# Patient Record
Sex: FEMALE | Race: WHITE
[De-identification: names, ages, dates, MRNs, and addresses within clinical notes are randomized per-mention and may not be internally consistent; named-entity substitution may affect disease eponyms.]

---

## 2020-09-02 ENCOUNTER — HOSPITAL ENCOUNTER (EMERGENCY)
Dept: HOSPITAL 12 - ER | Age: 35
LOS: 1 days | Discharge: HOME | End: 2020-09-03
Payer: COMMERCIAL

## 2020-09-02 VITALS — BODY MASS INDEX: 18.51 KG/M2 | WEIGHT: 125 LBS | HEIGHT: 69 IN

## 2020-09-02 DIAGNOSIS — Z85.850: ICD-10-CM

## 2020-09-02 DIAGNOSIS — E89.0: ICD-10-CM

## 2020-09-02 DIAGNOSIS — S09.90XA: Primary | ICD-10-CM
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
## 2020-09-02 PROCEDURE — A4663 DIALYSIS BLOOD PRESSURE CUFF: HCPCS

## 2020-09-03 VITALS — DIASTOLIC BLOOD PRESSURE: 81 MMHG | SYSTOLIC BLOOD PRESSURE: 128 MMHG

## 2020-09-03 NOTE — NUR
Patient discharged to home in stable condition.  Written and verbal after care 
instructions given. 

Patient verbalizes understanding of instructions. Stressed follow up or return 
to ER for worsening s/s. Patient states she has a friend to pick her up & drive 
her home.

## 2021-05-24 ENCOUNTER — HOSPITAL ENCOUNTER (EMERGENCY)
Dept: HOSPITAL 12 - ER | Age: 36
Discharge: HOME | End: 2021-05-24
Payer: COMMERCIAL

## 2021-05-24 VITALS — HEIGHT: 67 IN | BODY MASS INDEX: 19.04 KG/M2 | WEIGHT: 121.31 LBS

## 2021-05-24 VITALS — DIASTOLIC BLOOD PRESSURE: 82 MMHG | SYSTOLIC BLOOD PRESSURE: 135 MMHG

## 2021-05-24 DIAGNOSIS — E89.0: ICD-10-CM

## 2021-05-24 DIAGNOSIS — E83.42: ICD-10-CM
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
## 2021-05-24 DIAGNOSIS — M79.622: Primary | ICD-10-CM

## 2021-05-24 DIAGNOSIS — T50.B95A: ICD-10-CM

## 2021-05-24 LAB
ALP SERPL-CCNC: 47 U/L (ref 50–136)
ALT SERPL W/O P-5'-P-CCNC: 21 U/L (ref 14–59)
AST SERPL-CCNC: 19 U/L (ref 15–37)
BASOPHILS # BLD AUTO: 0 K/UL (ref 0–8)
BASOPHILS NFR BLD AUTO: 0.2 % (ref 0–2)
BILIRUB SERPL-MCNC: 0.8 MG/DL (ref 0.2–1)
BUN SERPL-MCNC: 11 MG/DL (ref 7–18)
CHLORIDE SERPL-SCNC: 106 MMOL/L (ref 98–107)
CO2 SERPL-SCNC: 31 MMOL/L (ref 21–32)
CREAT SERPL-MCNC: 1.1 MG/DL (ref 0.6–1.3)
EOSINOPHIL # BLD AUTO: 0.1 K/UL (ref 0–0.7)
EOSINOPHIL NFR BLD AUTO: 1.5 % (ref 0–7)
GLUCOSE SERPL-MCNC: 103 MG/DL (ref 74–106)
HCT VFR BLD AUTO: 42.5 % (ref 31.2–41.9)
HGB BLD-MCNC: 14.2 G/DL (ref 10.9–14.3)
LYMPHOCYTES # BLD AUTO: 2.1 K/UL (ref 20–40)
LYMPHOCYTES NFR BLD AUTO: 30.6 % (ref 20.5–51.5)
MAGNESIUM SERPL-MCNC: 1.8 MG/DL (ref 1.8–2.4)
MCH RBC QN AUTO: 29.2 UUG (ref 24.7–32.8)
MCHC RBC AUTO-ENTMCNC: 33 G/DL (ref 32.3–35.6)
MCV RBC AUTO: 87.6 FL (ref 75.5–95.3)
MONOCYTES # BLD AUTO: 0.3 K/UL (ref 2–10)
MONOCYTES NFR BLD AUTO: 4.3 % (ref 0–11)
NEUTROPHILS # BLD AUTO: 4.3 K/UL (ref 1.8–8.9)
NEUTROPHILS NFR BLD AUTO: 63.4 % (ref 38.5–71.5)
PLATELET # BLD AUTO: 271 K/UL (ref 179–408)
POTASSIUM SERPL-SCNC: 3.9 MMOL/L (ref 3.5–5.1)
RBC # BLD AUTO: 4.84 MIL/UL (ref 3.63–4.92)
WBC # BLD AUTO: 6.7 K/UL (ref 3.8–11.8)
WS STN SPEC: 7.3 G/DL (ref 6.4–8.2)

## 2021-05-24 PROCEDURE — A4663 DIALYSIS BLOOD PRESSURE CUFF: HCPCS

## 2021-05-24 NOTE — NUR
Patient discharged to home in stable condition.  Written and verbal after care 
instructions given. 

Patient verbalizes understanding of instructions. Stressed follow up or return 
to ER for worsening s/s.

Patient out of ER with steady gait, no acute signs of distress, VSS, all 
belongings taken, instructed not to drive, provided with copies of lab results, 
to be driven home via private vehicle by significant other.

## 2021-06-15 ENCOUNTER — HOSPITAL ENCOUNTER (EMERGENCY)
Dept: HOSPITAL 12 - ER | Age: 36
LOS: 1 days | Discharge: HOME | End: 2021-06-16
Payer: COMMERCIAL

## 2021-06-15 VITALS — BODY MASS INDEX: 18.99 KG/M2 | WEIGHT: 121 LBS | HEIGHT: 67 IN

## 2021-06-15 DIAGNOSIS — M79.10: ICD-10-CM

## 2021-06-15 DIAGNOSIS — E89.0: ICD-10-CM

## 2021-06-15 DIAGNOSIS — R07.9: Primary | ICD-10-CM

## 2021-06-15 DIAGNOSIS — R00.2: ICD-10-CM

## 2021-06-15 DIAGNOSIS — Z85.850: ICD-10-CM

## 2021-06-15 PROCEDURE — A4663 DIALYSIS BLOOD PRESSURE CUFF: HCPCS

## 2021-06-15 NOTE — NUR
Patient walked in from home with c/o feeling electrical current running down 
her body, stated mike symtpoms since she received moderna covid vaccine, has 
been seen in ER multiple times, seen by PCP for same symptoms.

## 2021-06-16 VITALS — DIASTOLIC BLOOD PRESSURE: 89 MMHG | SYSTOLIC BLOOD PRESSURE: 137 MMHG

## 2021-06-16 LAB
ALP SERPL-CCNC: 41 U/L (ref 50–136)
ALT SERPL W/O P-5'-P-CCNC: 12 U/L (ref 14–59)
APPEARANCE UR: CLEAR
AST SERPL-CCNC: 11 U/L (ref 15–37)
BASOPHILS # BLD AUTO: 0 K/UL (ref 0–8)
BASOPHILS NFR BLD AUTO: 0.4 % (ref 0–2)
BILIRUB DIRECT SERPL-MCNC: 0.1 MG/DL (ref 0–0.2)
BILIRUB SERPL-MCNC: 0.8 MG/DL (ref 0.2–1)
BILIRUB UR QL STRIP: NEGATIVE
BUN SERPL-MCNC: 13 MG/DL (ref 7–18)
CHLORIDE SERPL-SCNC: 107 MMOL/L (ref 98–107)
CO2 SERPL-SCNC: 27 MMOL/L (ref 21–32)
COLOR UR: YELLOW
CREAT SERPL-MCNC: 1 MG/DL (ref 0.6–1.3)
DEPRECATED SQUAMOUS URNS QL MICRO: (no result) /HPF
EOSINOPHIL # BLD AUTO: 0.2 K/UL (ref 0–0.7)
EOSINOPHIL NFR BLD AUTO: 2.4 % (ref 0–7)
GLUCOSE SERPL-MCNC: 86 MG/DL (ref 74–106)
GLUCOSE UR STRIP-MCNC: NEGATIVE MG/DL
HCG UR QL: NEGATIVE
HCT VFR BLD AUTO: 39.8 % (ref 31.2–41.9)
HGB BLD-MCNC: 13 G/DL (ref 10.9–14.3)
HGB UR QL STRIP: NEGATIVE
KETONES UR STRIP-MCNC: NEGATIVE MG/DL
LEUKOCYTE ESTERASE UR QL STRIP: NEGATIVE
LYMPHOCYTES # BLD AUTO: 2.4 K/UL (ref 20–40)
LYMPHOCYTES NFR BLD AUTO: 30.2 % (ref 20.5–51.5)
MCH RBC QN AUTO: 28.7 UUG (ref 24.7–32.8)
MCHC RBC AUTO-ENTMCNC: 33 G/DL (ref 32.3–35.6)
MCV RBC AUTO: 87.7 FL (ref 75.5–95.3)
MONOCYTES # BLD AUTO: 0.3 K/UL (ref 2–10)
MONOCYTES NFR BLD AUTO: 4.3 % (ref 0–11)
NEUTROPHILS # BLD AUTO: 5.1 K/UL (ref 1.8–8.9)
NEUTROPHILS NFR BLD AUTO: 62.7 % (ref 38.5–71.5)
NITRITE UR QL STRIP: NEGATIVE
PH UR STRIP: 7 [PH] (ref 5–8)
PLATELET # BLD AUTO: 263 K/UL (ref 179–408)
POTASSIUM SERPL-SCNC: 3.8 MMOL/L (ref 3.5–5.1)
RBC # BLD AUTO: 4.53 MIL/UL (ref 3.63–4.92)
RBC #/AREA URNS HPF: (no result) /HPF (ref 0–3)
SP GR UR STRIP: 1.02 (ref 1–1.03)
TSH SERPL DL<=0.005 MIU/L-ACNC: < 0.007 MIU/ML (ref 0.36–3.74)
UROBILINOGEN UR STRIP-MCNC: 0.2 E.U./DL
WBC # BLD AUTO: 8.1 K/UL (ref 3.8–11.8)
WBC #/AREA URNS HPF: (no result) /HPF
WBC #/AREA URNS HPF: (no result) /HPF (ref 0–3)
WS STN SPEC: 7.1 G/DL (ref 6.4–8.2)

## 2023-06-08 ENCOUNTER — HOSPITAL ENCOUNTER (EMERGENCY)
Dept: HOSPITAL 12 - ER | Age: 38
LOS: 1 days | Discharge: HOME | End: 2023-06-09
Payer: MEDICAID

## 2023-06-08 VITALS — HEIGHT: 69 IN | WEIGHT: 125 LBS | BODY MASS INDEX: 18.51 KG/M2

## 2023-06-08 DIAGNOSIS — L03.311: ICD-10-CM

## 2023-06-08 DIAGNOSIS — L02.211: ICD-10-CM

## 2023-06-08 DIAGNOSIS — Z91.040: ICD-10-CM

## 2023-06-08 DIAGNOSIS — L72.3: Primary | ICD-10-CM

## 2023-06-08 DIAGNOSIS — Z79.899: ICD-10-CM

## 2023-06-08 PROCEDURE — 10060 I&D ABSCESS SIMPLE/SINGLE: CPT

## 2023-06-08 PROCEDURE — 99284 EMERGENCY DEPT VISIT MOD MDM: CPT

## 2023-06-08 PROCEDURE — A4663 DIALYSIS BLOOD PRESSURE CUFF: HCPCS

## 2023-06-09 VITALS — DIASTOLIC BLOOD PRESSURE: 77 MMHG | SYSTOLIC BLOOD PRESSURE: 127 MMHG

## 2023-06-09 NOTE — NUR
Patient discharged to home in stable condition.  Written and verbal after care 
instructions given. 

Patient verbalizes understanding of instructions. Stressed follow up or return 
to ER for worsening s/s.

Patient is a/ox4, NAD noted. Patient ambulated with steady gait